# Patient Record
Sex: MALE | Race: WHITE | URBAN - METROPOLITAN AREA
[De-identification: names, ages, dates, MRNs, and addresses within clinical notes are randomized per-mention and may not be internally consistent; named-entity substitution may affect disease eponyms.]

---

## 2020-08-20 ENCOUNTER — OFFICE VISIT (OUTPATIENT)
Dept: URBAN - METROPOLITAN AREA CLINIC 2 | Facility: CLINIC | Age: 25
End: 2020-08-20
Payer: MEDICAID

## 2020-08-20 ENCOUNTER — DASHBOARD ENCOUNTERS (OUTPATIENT)
Age: 25
End: 2020-08-20

## 2020-08-20 DIAGNOSIS — N50.89 TESTICLE SWELLING: ICD-10-CM

## 2020-08-20 DIAGNOSIS — R93.5 ABNORMAL CT OF THE ABDOMEN: ICD-10-CM

## 2020-08-20 PROCEDURE — G8427 DOCREV CUR MEDS BY ELIG CLIN: HCPCS | Performed by: NURSE PRACTITIONER

## 2020-08-20 PROCEDURE — 99202 OFFICE O/P NEW SF 15 MIN: CPT | Performed by: NURSE PRACTITIONER

## 2020-08-20 PROCEDURE — G9902 PT SCRN TBCO AND ID AS USER: HCPCS | Performed by: NURSE PRACTITIONER

## 2020-08-20 PROCEDURE — G8420 CALC BMI NORM PARAMETERS: HCPCS | Performed by: NURSE PRACTITIONER

## 2020-08-20 NOTE — HPI-TODAY'S VISIT:
Patient is a 24 year old patient here on referral from Merlyn Orantes DNP after an abnormal CT which noted small intermittent small bowel intusseption in left lower abdomen. Patient denies abdominal pain. He denies straining with bowel movements. He denies hemorrhoids or rectal bleeding. He has regular formed bowel  movements daily. He does complain of right testicular pain. He had epidydimitis 1-2 months ago and was treated with antibiotics after seeing PCP. Since then he has continued to have bulge on right testicles that is painful if he is lifting weights. He has good appetite and weight is stable. He is accompained to the office today with his mother. Patient plays baseball for school and is anxious to get clearance to play.